# Patient Record
Sex: FEMALE | Race: WHITE | NOT HISPANIC OR LATINO | URBAN - METROPOLITAN AREA
[De-identification: names, ages, dates, MRNs, and addresses within clinical notes are randomized per-mention and may not be internally consistent; named-entity substitution may affect disease eponyms.]

---

## 2018-05-01 ENCOUNTER — OFFICE (OUTPATIENT)
Dept: URBAN - METROPOLITAN AREA CLINIC 32 | Facility: CLINIC | Age: 63
End: 2018-05-01
Payer: COMMERCIAL

## 2018-05-01 VITALS
RESPIRATION RATE: 12 BRPM | DIASTOLIC BLOOD PRESSURE: 90 MMHG | TEMPERATURE: 98.6 F | SYSTOLIC BLOOD PRESSURE: 162 MMHG | DIASTOLIC BLOOD PRESSURE: 75 MMHG | SYSTOLIC BLOOD PRESSURE: 121 MMHG | DIASTOLIC BLOOD PRESSURE: 70 MMHG | RESPIRATION RATE: 16 BRPM | HEART RATE: 56 BPM | HEART RATE: 54 BPM | DIASTOLIC BLOOD PRESSURE: 101 MMHG | HEIGHT: 66 IN | RESPIRATION RATE: 10 BRPM | HEART RATE: 64 BPM | OXYGEN SATURATION: 97 % | WEIGHT: 140 LBS | SYSTOLIC BLOOD PRESSURE: 119 MMHG | DIASTOLIC BLOOD PRESSURE: 82 MMHG | SYSTOLIC BLOOD PRESSURE: 131 MMHG | DIASTOLIC BLOOD PRESSURE: 76 MMHG | DIASTOLIC BLOOD PRESSURE: 86 MMHG | HEART RATE: 68 BPM | SYSTOLIC BLOOD PRESSURE: 127 MMHG | RESPIRATION RATE: 17 BRPM | DIASTOLIC BLOOD PRESSURE: 61 MMHG | SYSTOLIC BLOOD PRESSURE: 149 MMHG | TEMPERATURE: 97.2 F | SYSTOLIC BLOOD PRESSURE: 112 MMHG | OXYGEN SATURATION: 100 % | DIASTOLIC BLOOD PRESSURE: 103 MMHG | SYSTOLIC BLOOD PRESSURE: 122 MMHG | OXYGEN SATURATION: 98 % | HEART RATE: 69 BPM | OXYGEN SATURATION: 93 % | RESPIRATION RATE: 14 BRPM

## 2018-05-01 DIAGNOSIS — Z86.010 PERSONAL HISTORY OF COLONIC POLYPS: ICD-10-CM

## 2018-05-01 PROBLEM — K64.8 OTHER HEMORRHOIDS: Status: ACTIVE | Noted: 2018-05-01

## 2018-05-01 PROBLEM — K57.30 DVRTCLOS OF LG INT W/O PERFORATION OR ABSCESS W/O BLEEDING: Status: ACTIVE | Noted: 2018-05-01

## 2018-05-01 RX ADMIN — LIDOCAINE HYDROCHLORIDE 60 MG: 20 INJECTION, SOLUTION INFILTRATION; PERINEURAL at 08:13

## 2018-06-01 PROBLEM — Z12.11 AVERAGE RISK SCREENING COLON: Status: ACTIVE | Noted: 2018-05-01

## 2023-12-29 ENCOUNTER — APPOINTMENT (OUTPATIENT)
Facility: HOSPITAL | Age: 68
End: 2023-12-29
Payer: MEDICARE

## 2023-12-29 ENCOUNTER — HOSPITAL ENCOUNTER (EMERGENCY)
Facility: HOSPITAL | Age: 68
Discharge: HOME | End: 2023-12-29
Attending: EMERGENCY MEDICINE
Payer: MEDICARE

## 2023-12-29 VITALS
WEIGHT: 128 LBS | HEIGHT: 66 IN | TEMPERATURE: 98.3 F | HEART RATE: 71 BPM | RESPIRATION RATE: 16 BRPM | BODY MASS INDEX: 20.57 KG/M2 | DIASTOLIC BLOOD PRESSURE: 98 MMHG | SYSTOLIC BLOOD PRESSURE: 156 MMHG | OXYGEN SATURATION: 100 %

## 2023-12-29 DIAGNOSIS — S52.502A CLOSED FRACTURE OF DISTAL END OF LEFT RADIUS, UNSPECIFIED FRACTURE MORPHOLOGY, INITIAL ENCOUNTER: Primary | ICD-10-CM

## 2023-12-29 DIAGNOSIS — S52.615A CLOSED NONDISPLACED FRACTURE OF STYLOID PROCESS OF LEFT ULNA, INITIAL ENCOUNTER: ICD-10-CM

## 2023-12-29 PROCEDURE — 99283 EMERGENCY DEPT VISIT LOW MDM: CPT

## 2023-12-29 PROCEDURE — 29125 APPL SHORT ARM SPLINT STATIC: CPT

## 2023-12-29 PROCEDURE — 73110 X-RAY EXAM OF WRIST: CPT

## 2023-12-29 PROCEDURE — 73100 X-RAY EXAM OF WRIST: CPT

## 2023-12-29 PROCEDURE — 6370000000 HC RX 637 (ALT 250 FOR IP): Performed by: EMERGENCY MEDICINE

## 2023-12-29 RX ORDER — HYDROCODONE BITARTRATE AND ACETAMINOPHEN 7.5; 325 MG/1; MG/1
1 TABLET ORAL
Status: COMPLETED | OUTPATIENT
Start: 2023-12-29 | End: 2023-12-29

## 2023-12-29 RX ORDER — HYDROCODONE BITARTRATE AND ACETAMINOPHEN 5; 325 MG/1; MG/1
1 TABLET ORAL EVERY 6 HOURS PRN
Qty: 15 TABLET | Refills: 0 | Status: SHIPPED | OUTPATIENT
Start: 2023-12-29 | End: 2024-01-03

## 2023-12-29 RX ADMIN — HYDROCODONE BITARTRATE AND ACETAMINOPHEN 1 TABLET: 7.5; 325 TABLET ORAL at 14:08

## 2023-12-29 ASSESSMENT — PAIN DESCRIPTION - ORIENTATION: ORIENTATION: RIGHT

## 2023-12-29 ASSESSMENT — LIFESTYLE VARIABLES: HOW OFTEN DO YOU HAVE A DRINK CONTAINING ALCOHOL: NEVER

## 2023-12-29 ASSESSMENT — PAIN SCALES - GENERAL
PAINLEVEL_OUTOF10: 5
PAINLEVEL_OUTOF10: 10
PAINLEVEL_OUTOF10: 7

## 2023-12-29 ASSESSMENT — PAIN DESCRIPTION - LOCATION: LOCATION: WRIST

## 2023-12-29 ASSESSMENT — PAIN - FUNCTIONAL ASSESSMENT
PAIN_FUNCTIONAL_ASSESSMENT: 0-10
PAIN_FUNCTIONAL_ASSESSMENT: 0-10

## 2023-12-29 ASSESSMENT — PAIN DESCRIPTION - DESCRIPTORS: DESCRIPTORS: ACHING

## 2023-12-29 NOTE — ED NOTES
Splint applied by MD Alexandro Calhoun and sling applied. Discharge instructions and medications reviewed with pt. Pt has no questions at this time and will follow up on Tuesday with Ortho.

## 2023-12-29 NOTE — ED PROVIDER NOTES
fracture of distal end of left radius, unspecified fracture morphology, initial encounter    2. Closed nondisplaced fracture of styloid process of left ulna, initial encounter          DISPOSITION/PLAN   Essie Tavares's  results have been reviewed with her. She has been counseled regarding her diagnosis, treatment, and plan. She verbally conveys understanding and agreement of the signs, symptoms, diagnosis, treatment and prognosis and additionally agrees to follow up as discussed. She also agrees with the care-plan and conveys that all of her questions have been answered. I have also provided discharge instructions for her that include: educational information regarding their diagnosis and treatment, and list of reasons why they would want to return to the ED prior to their follow-up appointment, should her condition change. CLINICAL IMPRESSION    Discharge Note: The patient is stable for discharge home. The signs, symptoms, diagnosis, and discharge instructions have been discussed, understanding conveyed, and agreed upon. The patient is to follow up as recommended or return to ER should their symptoms worsen. PATIENT REFERRED TO:  Flaca Marquis MD  [address] [place] [address] [place]  [address]   [phone]             DISCHARGE MEDICATIONS:     Medication List        START taking these medications      HYDROcodone-acetaminophen 5-325 MG per tablet  Commonly known as: Norco  Take 1 tablet by mouth every 6 hours as needed for Pain for up to 5 days. Intended supply: 3 days.  Take lowest dose possible to manage pain Max Daily Amount: 4 tablets               Where to Get Your Medications        These medications were sent to [facility]/pharmacy [address] [place] [address], [address] [place], [place] [id number]      Phone: [phone]   HYDROcodone-acetaminophen 5-325 MG per tablet           DISCONTINUED MEDICATIONS:  There are no discharge medications for this patient.      I am the Primary Clinician of Record.   Mckay Rivera DO (electronically signed)    (Please note that parts of this dictation were completed with voice recognition software. Quite often unanticipated grammatical, syntax, homophones, and other interpretive errors are inadvertently transcribed by the computer software. Please disregards these errors. Please excuse any errors that have escaped final proofreading.)          Mckay Rivera,   12/31/23 1912

## 2023-12-29 NOTE — DISCHARGE INSTRUCTIONS
Dr. Maty Rinaldi should reach out to you for an appointment Tuesday morning. Nothing to eat or drink after midnight on Monday with the exception of sips of water with your normal daily medications on Tuesday morning.

## 2024-01-01 ENCOUNTER — PREP FOR PROCEDURE (OUTPATIENT)
Facility: HOSPITAL | Age: 69
End: 2024-01-01

## 2024-01-01 DIAGNOSIS — S52.532A COLLES' FRACTURE OF LEFT RADIUS, INITIAL ENCOUNTER FOR CLOSED FRACTURE: ICD-10-CM

## 2024-01-01 RX ORDER — SODIUM CHLORIDE, SODIUM LACTATE, POTASSIUM CHLORIDE, CALCIUM CHLORIDE 600; 310; 30; 20 MG/100ML; MG/100ML; MG/100ML; MG/100ML
INJECTION, SOLUTION INTRAVENOUS CONTINUOUS
Status: CANCELLED | OUTPATIENT
Start: 2024-01-01

## 2024-01-01 NOTE — H&P
History and Physical    Patient: Susan Tavares MRN: [id number]  SSN: [id number]    YOB: 1955  Age: 68 y.o.  Sex: female      Subjective:      Susan Tavares is a 68 y.o. female who  fell on her outstretched left wrist on 12/29/2023.  She has a displaced unstable comminuted distal radius fracture and ulna styloid fracture of the left wrist.  She is here for open reduction internal fixation of unstable distal radius fracture.  For more details of preoperative discussion refer to my office note..     No past medical history on file.  No past surgical history on file.   No family history on file.  Social History     Tobacco Use    Smoking status: Not on file    Smokeless tobacco: Not on file   Substance Use Topics    Alcohol use: Not on file      Prior to Admission medications    Medication Sig Start Date End Date Taking? Authorizing Provider   HYDROcodone-acetaminophen (NORCO) 5-325 MG per tablet Take 1 tablet by mouth every 6 hours as needed for Pain for up to 5 days. Intended supply: 3 days. Take lowest dose possible to manage pain Max Daily Amount: 4 tablets 12/29/23 1/3/24  Mckay Rivera, DO        No Known Allergies    Review of Systems:  A comprehensive review of systems was negative.    Objective:     There were no vitals filed for this visit.     Physical Exam:  General Appearance: alert and oriented to person, place and time, well-developed and well-nourished, in no acute distress  Pulmonary/Chest: clear to auscultation bilaterally- no wheezes, rales or rhonchi, normal air movement, no respiratory distress  Cardiovascular: normal rate, normal S1 and S2, no gallops, intact distal pulses, and no carotid bruits  Abdomen: soft, non-tender, non-distended, normal bowel sounds, no masses or organomegaly  Extremities: no cyanosis and no clubbing, except left upper extremity with deformity.  No blisters.  Good vascular supply.  Musculoskeletal: normal range of motion, no joint swelling, deformity

## 2024-01-02 ENCOUNTER — ANESTHESIA EVENT (OUTPATIENT)
Facility: HOSPITAL | Age: 69
End: 2024-01-02
Payer: MEDICARE

## 2024-01-02 ENCOUNTER — APPOINTMENT (OUTPATIENT)
Facility: HOSPITAL | Age: 69
End: 2024-01-02
Attending: ORTHOPAEDIC SURGERY
Payer: MEDICARE

## 2024-01-02 ENCOUNTER — ANESTHESIA (OUTPATIENT)
Facility: HOSPITAL | Age: 69
End: 2024-01-02
Payer: MEDICARE

## 2024-01-02 ENCOUNTER — HOSPITAL ENCOUNTER (OUTPATIENT)
Facility: HOSPITAL | Age: 69
Setting detail: OUTPATIENT SURGERY
Discharge: HOME | End: 2024-01-02
Attending: ORTHOPAEDIC SURGERY | Admitting: ORTHOPAEDIC SURGERY
Payer: MEDICARE

## 2024-01-02 VITALS
BODY MASS INDEX: 20.57 KG/M2 | SYSTOLIC BLOOD PRESSURE: 159 MMHG | RESPIRATION RATE: 13 BRPM | WEIGHT: 128 LBS | HEART RATE: 64 BPM | HEIGHT: 66 IN | OXYGEN SATURATION: 95 % | TEMPERATURE: 98.7 F | DIASTOLIC BLOOD PRESSURE: 81 MMHG

## 2024-01-02 DIAGNOSIS — S52.532A CLOSED COLLES' FRACTURE OF LEFT RADIUS, INITIAL ENCOUNTER: Primary | ICD-10-CM

## 2024-01-02 PROCEDURE — 2709999900 HC NON-CHARGEABLE SUPPLY: Performed by: ORTHOPAEDIC SURGERY

## 2024-01-02 PROCEDURE — 2580000003 HC RX 258: Performed by: ANESTHESIOLOGY

## 2024-01-02 PROCEDURE — C1713 ANCHOR/SCREW BN/BN,TIS/BN: HCPCS | Performed by: ORTHOPAEDIC SURGERY

## 2024-01-02 PROCEDURE — 2500000003 HC RX 250 WO HCPCS: Performed by: ANESTHESIOLOGY

## 2024-01-02 PROCEDURE — 3700000001 HC ADD 15 MINUTES (ANESTHESIA): Performed by: ORTHOPAEDIC SURGERY

## 2024-01-02 PROCEDURE — 6360000002 HC RX W HCPCS: Performed by: ORTHOPAEDIC SURGERY

## 2024-01-02 PROCEDURE — 6360000002 HC RX W HCPCS: Performed by: ANESTHESIOLOGY

## 2024-01-02 PROCEDURE — 7100000000 HC PACU RECOVERY - FIRST 15 MIN: Performed by: ORTHOPAEDIC SURGERY

## 2024-01-02 PROCEDURE — 7100000001 HC PACU RECOVERY - ADDTL 15 MIN: Performed by: ORTHOPAEDIC SURGERY

## 2024-01-02 PROCEDURE — 2580000003 HC RX 258: Performed by: ORTHOPAEDIC SURGERY

## 2024-01-02 PROCEDURE — 3600000014 HC SURGERY LEVEL 4 ADDTL 15MIN: Performed by: ORTHOPAEDIC SURGERY

## 2024-01-02 PROCEDURE — 3700000000 HC ANESTHESIA ATTENDED CARE: Performed by: ORTHOPAEDIC SURGERY

## 2024-01-02 PROCEDURE — 3600000004 HC SURGERY LEVEL 4 BASE: Performed by: ORTHOPAEDIC SURGERY

## 2024-01-02 DEVICE — PEG BONE FXTN L20MM D2.2MM DST VOLAR RDL SMOOTH LOK CRSSLCK: Type: IMPLANTABLE DEVICE | Site: WRIST | Status: FUNCTIONAL

## 2024-01-02 DEVICE — PLATE BNE W22XL41MM 12 H L DST RAD TI LOK COMPR LO PROF NAR: Type: IMPLANTABLE DEVICE | Site: WRIST | Status: FUNCTIONAL

## 2024-01-02 DEVICE — SCREW BNE L14MM DIA2.7MM DST VOLAR RAD LOK FULL THRD SQ DRV: Type: IMPLANTABLE DEVICE | Site: WRIST | Status: FUNCTIONAL

## 2024-01-02 DEVICE — SCREW BONE L22MM DIA2.7MM DSTL VOLAR RAD MULTDIR FULL THRD: Type: IMPLANTABLE DEVICE | Site: WRIST | Status: FUNCTIONAL

## 2024-01-02 DEVICE — PEG BNE FIX L16MM DIA2.2MM DST VOLAR RAD SMOOTH LOK FOR DVR: Type: IMPLANTABLE DEVICE | Site: WRIST | Status: FUNCTIONAL

## 2024-01-02 DEVICE — SCREW BNE L24MM DIA2.7MM DST RAD LOK FULL THRD SQ DRV HD LO: Type: IMPLANTABLE DEVICE | Site: WRIST | Status: FUNCTIONAL

## 2024-01-02 DEVICE — K WIRE FIX DIA1.6MM S STL FOR DST VOLAR PLATING SYS: Type: IMPLANTABLE DEVICE | Site: WRIST | Status: FUNCTIONAL

## 2024-01-02 DEVICE — SCREW BNE L14MM DIA2.7MM DST VOLAR RAD NONLOCKING FULL THRD: Type: IMPLANTABLE DEVICE | Site: WRIST | Status: FUNCTIONAL

## 2024-01-02 RX ORDER — DEXAMETHASONE SODIUM PHOSPHATE 10 MG/ML
INJECTION INTRAMUSCULAR; INTRAVENOUS PRN
Status: DISCONTINUED | OUTPATIENT
Start: 2024-01-02 | End: 2024-01-02 | Stop reason: SDUPTHER

## 2024-01-02 RX ORDER — BUPIVACAINE HYDROCHLORIDE 5 MG/ML
30 INJECTION, SOLUTION EPIDURAL; INTRACAUDAL ONCE
Status: DISCONTINUED | OUTPATIENT
Start: 2024-01-02 | End: 2024-01-02 | Stop reason: HOSPADM

## 2024-01-02 RX ORDER — SODIUM CHLORIDE 0.9 % (FLUSH) 0.9 %
5-40 SYRINGE (ML) INJECTION PRN
Status: DISCONTINUED | OUTPATIENT
Start: 2024-01-02 | End: 2024-01-02 | Stop reason: HOSPADM

## 2024-01-02 RX ORDER — SODIUM CHLORIDE 0.9 % (FLUSH) 0.9 %
5-40 SYRINGE (ML) INJECTION EVERY 12 HOURS SCHEDULED
Status: DISCONTINUED | OUTPATIENT
Start: 2024-01-02 | End: 2024-01-02 | Stop reason: HOSPADM

## 2024-01-02 RX ORDER — HYDROMORPHONE HYDROCHLORIDE 2 MG/ML
INJECTION, SOLUTION INTRAMUSCULAR; INTRAVENOUS; SUBCUTANEOUS PRN
Status: DISCONTINUED | OUTPATIENT
Start: 2024-01-02 | End: 2024-01-02 | Stop reason: SDUPTHER

## 2024-01-02 RX ORDER — SODIUM CHLORIDE, SODIUM LACTATE, POTASSIUM CHLORIDE, CALCIUM CHLORIDE 600; 310; 30; 20 MG/100ML; MG/100ML; MG/100ML; MG/100ML
INJECTION, SOLUTION INTRAVENOUS CONTINUOUS
Status: DISCONTINUED | OUTPATIENT
Start: 2024-01-02 | End: 2024-01-02 | Stop reason: HOSPADM

## 2024-01-02 RX ORDER — MIDAZOLAM HYDROCHLORIDE 1 MG/ML
INJECTION INTRAMUSCULAR; INTRAVENOUS PRN
Status: DISCONTINUED | OUTPATIENT
Start: 2024-01-02 | End: 2024-01-02 | Stop reason: SDUPTHER

## 2024-01-02 RX ORDER — HYDROCODONE BITARTRATE AND ACETAMINOPHEN 5; 325 MG/1; MG/1
1 TABLET ORAL EVERY 6 HOURS PRN
Qty: 12 TABLET | Refills: 0 | Status: SHIPPED | OUTPATIENT
Start: 2024-01-02 | End: 2024-01-05

## 2024-01-02 RX ORDER — PROPOFOL 10 MG/ML
INJECTION, EMULSION INTRAVENOUS PRN
Status: DISCONTINUED | OUTPATIENT
Start: 2024-01-02 | End: 2024-01-02 | Stop reason: SDUPTHER

## 2024-01-02 RX ORDER — SODIUM CHLORIDE 9 MG/ML
INJECTION, SOLUTION INTRAVENOUS PRN
Status: DISCONTINUED | OUTPATIENT
Start: 2024-01-02 | End: 2024-01-02 | Stop reason: HOSPADM

## 2024-01-02 RX ORDER — BUPIVACAINE HYDROCHLORIDE 5 MG/ML
INJECTION, SOLUTION EPIDURAL; INTRACAUDAL PRN
Status: DISCONTINUED | OUTPATIENT
Start: 2024-01-02 | End: 2024-01-02 | Stop reason: ALTCHOICE

## 2024-01-02 RX ORDER — HYDROMORPHONE HYDROCHLORIDE 2 MG/ML
0.5 INJECTION, SOLUTION INTRAMUSCULAR; INTRAVENOUS; SUBCUTANEOUS EVERY 5 MIN PRN
Status: DISCONTINUED | OUTPATIENT
Start: 2024-01-02 | End: 2024-01-02 | Stop reason: HOSPADM

## 2024-01-02 RX ORDER — IBUPROFEN 800 MG/1
800 TABLET ORAL 4 TIMES DAILY PRN
Qty: 120 TABLET | Refills: 1 | Status: SHIPPED | OUTPATIENT
Start: 2024-01-02

## 2024-01-02 RX ORDER — FENTANYL CITRATE 50 UG/ML
INJECTION, SOLUTION INTRAMUSCULAR; INTRAVENOUS PRN
Status: DISCONTINUED | OUTPATIENT
Start: 2024-01-02 | End: 2024-01-02 | Stop reason: SDUPTHER

## 2024-01-02 RX ORDER — KETOROLAC TROMETHAMINE 30 MG/ML
INJECTION, SOLUTION INTRAMUSCULAR; INTRAVENOUS PRN
Status: DISCONTINUED | OUTPATIENT
Start: 2024-01-02 | End: 2024-01-02 | Stop reason: SDUPTHER

## 2024-01-02 RX ORDER — LIDOCAINE HYDROCHLORIDE 20 MG/ML
INJECTION, SOLUTION EPIDURAL; INFILTRATION; INTRACAUDAL; PERINEURAL PRN
Status: DISCONTINUED | OUTPATIENT
Start: 2024-01-02 | End: 2024-01-02 | Stop reason: SDUPTHER

## 2024-01-02 RX ORDER — LISINOPRIL 10 MG/1
10 TABLET ORAL DAILY
COMMUNITY

## 2024-01-02 RX ORDER — ONDANSETRON 2 MG/ML
INJECTION INTRAMUSCULAR; INTRAVENOUS PRN
Status: DISCONTINUED | OUTPATIENT
Start: 2024-01-02 | End: 2024-01-02 | Stop reason: SDUPTHER

## 2024-01-02 RX ADMIN — DEXAMETHASONE SODIUM PHOSPHATE 4 MG: 10 INJECTION INTRAMUSCULAR; INTRAVENOUS at 10:36

## 2024-01-02 RX ADMIN — HYDROMORPHONE HYDROCHLORIDE 0.5 MG: 2 INJECTION INTRAMUSCULAR; INTRAVENOUS; SUBCUTANEOUS at 11:17

## 2024-01-02 RX ADMIN — HYDROMORPHONE HYDROCHLORIDE 0.5 MG: 2 INJECTION INTRAMUSCULAR; INTRAVENOUS; SUBCUTANEOUS at 11:33

## 2024-01-02 RX ADMIN — MIDAZOLAM HYDROCHLORIDE 2 MG: 1 INJECTION, SOLUTION INTRAMUSCULAR; INTRAVENOUS at 10:16

## 2024-01-02 RX ADMIN — HYDROMORPHONE HYDROCHLORIDE 0.5 MG: 2 INJECTION INTRAMUSCULAR; INTRAVENOUS; SUBCUTANEOUS at 10:42

## 2024-01-02 RX ADMIN — SODIUM CHLORIDE, POTASSIUM CHLORIDE, SODIUM LACTATE AND CALCIUM CHLORIDE: 600; 310; 30; 20 INJECTION, SOLUTION INTRAVENOUS at 09:12

## 2024-01-02 RX ADMIN — PHENYLEPHRINE HYDROCHLORIDE 40 MCG: 10 INJECTION INTRAVENOUS at 10:54

## 2024-01-02 RX ADMIN — FENTANYL CITRATE 50 MCG: 50 INJECTION, SOLUTION INTRAMUSCULAR; INTRAVENOUS at 10:39

## 2024-01-02 RX ADMIN — PHENYLEPHRINE HYDROCHLORIDE 80 MCG: 10 INJECTION INTRAVENOUS at 10:50

## 2024-01-02 RX ADMIN — PHENYLEPHRINE HYDROCHLORIDE 80 MCG: 10 INJECTION INTRAVENOUS at 11:24

## 2024-01-02 RX ADMIN — HYDROMORPHONE HYDROCHLORIDE 0.5 MG: 2 INJECTION INTRAMUSCULAR; INTRAVENOUS; SUBCUTANEOUS at 11:04

## 2024-01-02 RX ADMIN — PHENYLEPHRINE HYDROCHLORIDE 80 MCG: 10 INJECTION INTRAVENOUS at 10:33

## 2024-01-02 RX ADMIN — PHENYLEPHRINE HYDROCHLORIDE 40 MCG: 10 INJECTION INTRAVENOUS at 11:00

## 2024-01-02 RX ADMIN — KETOROLAC TROMETHAMINE 15 MG: 30 INJECTION, SOLUTION INTRAMUSCULAR at 11:30

## 2024-01-02 RX ADMIN — ONDANSETRON 4 MG: 2 INJECTION INTRAMUSCULAR; INTRAVENOUS at 11:21

## 2024-01-02 RX ADMIN — CEFAZOLIN 2000 MG: 2 INJECTION, POWDER, FOR SOLUTION INTRAMUSCULAR; INTRAVENOUS at 10:27

## 2024-01-02 RX ADMIN — LIDOCAINE HYDROCHLORIDE 100 MG: 20 INJECTION, SOLUTION EPIDURAL; INFILTRATION; INTRACAUDAL; PERINEURAL at 10:19

## 2024-01-02 RX ADMIN — PROPOFOL 150 MG: 10 INJECTION, EMULSION INTRAVENOUS at 10:20

## 2024-01-02 RX ADMIN — FENTANYL CITRATE 50 MCG: 50 INJECTION, SOLUTION INTRAMUSCULAR; INTRAVENOUS at 10:22

## 2024-01-02 ASSESSMENT — PAIN SCALES - GENERAL: PAINLEVEL_OUTOF10: 0

## 2024-01-02 ASSESSMENT — PAIN - FUNCTIONAL ASSESSMENT: PAIN_FUNCTIONAL_ASSESSMENT: 0-10

## 2024-01-02 NOTE — OP NOTE
[]Hide copied text  []Peter for attribution information  DALJIT NICKO [address], [address]   [place] [address]   OPERATIVE REPORT          Name: Susan Tavares  MRN:  [id number]  : 1955  Age:  68 y.o.  Surgery Date: 2024     PREOPERATIVE DIAGNOSIS: Comminuted, impacted left  distal radius   fracture.     POSTOPERATIVE DIAGNOSIS: Comminuted, impacted left  distal radius   fracture.     PROCEDURE: Open reduction internal fixation with DVR plate, left  wrist.       SURGEON: Tomasz Plummer MD     ANESTHESIA: General with regional block.     ESTIMATED BLOOD LOSS: 5 mL.      COMPLICATIONS: none.       TOURNIQUET TIME: 73 minutes.       BRIEF INDICATION: Patient has an unstable distal radius fracture left     DESCRIPTION OF PROCEDURE: The patient was brought to the operating   Room and placed supine on the operating table. After regional   anesthesia.  The patient was placed under general anesthesia. The  arm placed on   the arm board. The tourniquet was applied. The  arm was prepped and   draped in the usual sterile fashion. A time-out was called, verifying patient   site of surgery, and procedure.  Antibiotic was given preoperatively. At this point,   the extremity was exsanguinated, the tourniquet inflated to 250 mmHg. A   longitudinal incision carried in a zigzag fashion across the wrist flexion crease   was made along the FCR tendon. Dissection carried sharply through skin.   Bleeders were controlled with electrocautery as necessary. The FCR tendon   was identified. The fascia overlying this was split. The tendon was retracted   medially and the floor of the tendon sheath was incised sharply with a knife   and split proximally and distally with scissors. This gave access to the   intermediate level where a digital dissection was used to sweep the muscles   medially.  Care was taken to avoid injury to the neurovascular structures..Deep retractors

## 2024-01-02 NOTE — PROGRESS NOTES
Patient meets criteria for discharge. CAOx4, NAD, no nausea, patient comfortable, put placed in her own arm sling.

## 2024-01-02 NOTE — BRIEF OP NOTE
Brief Postoperative Note      Patient: Susan Tavares  YOB: 1955  MRN: [id number]    Date of Procedure: 1/2/2024    Pre-Op Diagnosis Codes:     * Closed Colles' fracture of left radius, initial encounter [S52.532A]    Post-Op Diagnosis: Same       Procedure(s):  WRIST OPEN REDUCTION INTERNAL FIXATION    Surgeon(s):  Tomasz Plummer MD    Assistant:  * No surgical staff found *    Anesthesia: General    Estimated Blood Loss (mL): Minimal    Complications: None    Specimens:   * No specimens in log *    Implants:  Implant Name Type Inv. Item Serial No.  Lot No. LRB No. Used Action   DVR Crosslock Narrow Mini, Left Screw/Plate/Nail/Aj   [employer]-  Left 1 Implanted   SCREW BNE L14MM DIA2.7MM DST VOLAR RAD NONLOCKING FULL THRD - [id number] Screw/Plate/Nail/Aj SCREW BNE L14MM DIA2.7MM DST VOLAR RAD NONLOCKING FULL THRD  [employer]-  Left 1 Implanted   SCREW BNE L24MM DIA2.7MM DST RAD BUDDY FULL THRD SQ DRV HD LO - [id number] Screw/Plate/Nail/Aj SCREW BNE L24MM DIA2.7MM DST RAD BUDDY FULL THRD SQ DRV HD LO  [employer]-  Left 1 Implanted   PEG BONE FXTN L20MM D2.2MM DST VOLAR RDL SMOOTH BUDDY CRSSLCK - [id number] Screw/Plate/Nail/Aj PEG BONE FXTN L20MM D2.2MM DST VOLAR RDL SMOOTH BUDDY CRSSLCK  [employer]-  Left 2 Implanted   PEG BNE FIX L16MM DIA2.2MM DST VOLAR RAD SMOOTH BUDDY FOR DVR - [id number] Screw/Plate/Nail/Aj PEG BNE FIX L16MM DIA2.2MM DST VOLAR RAD SMOOTH BUDDY FOR DVR  [employer]-  Left 2 Implanted   SCREW BONE L22MM DIA2.7MM DSTL VOLAR RAD MULTDIR FULL THRD - [id number] Screw/Plate/Nail/Aj SCREW BONE L22MM DIA2.7MM DSTL VOLAR RAD MULTDIR FULL THRD  [employer]-  Left 1 Implanted   SCREW BNE L14MM DIA2.7MM DST VOLAR RAD BUDDY FULL THRD SQ DRV - [id number] Screw/Plate/Nail/Aj SCREW BNE L14MM DIA2.7MM DST VOLAR RAD BUDDY FULL THRD SQ DRV  [employer]WD  Left 2 Implanted   K WIRE FIX DIA1.6MM S STL FOR DST VOLAR PLATING

## 2024-01-02 NOTE — H&P
History and Physical    Patient: Susan Tavares MRN: [id number]  SSN: [id number]    YOB: 1955  Age: 68 y.o.  Sex: female      Subjective:      Susan Tavares is a 68 y.o. female who  fell on her outstretched left wrist on 12/29/2023.  She has a displaced unstable comminuted distal radius fracture and ulna styloid fracture of the left wrist.  She is here for open reduction internal fixation of unstable distal radius fracture.  For more details of preoperative discussion refer to my office note..     Past Medical History:   Diagnosis Date    Hypertension      Past Surgical History:   Procedure Laterality Date    FOOT MASS EXCISION Left       No family history on file.  Social History     Tobacco Use    Smoking status: Never    Smokeless tobacco: Never   Substance Use Topics    Alcohol use: Yes     Comment: occasional      Prior to Admission medications    Medication Sig Start Date End Date Taking? Authorizing Provider   lisinopril (PRINIVIL;ZESTRIL) 10 MG tablet Take 1 tablet by mouth daily   Yes Provider, MD Dewayne   HYDROcodone-acetaminophen (NORCO) 5-325 MG per tablet Take 1 tablet by mouth every 6 hours as needed for Pain for up to 5 days. Intended supply: 3 days. Take lowest dose possible to manage pain Max Daily Amount: 4 tablets 12/29/23 1/3/24  Mckay Rivera, DO        Allergies   Allergen Reactions    Sulfa Antibiotics Hives       Review of Systems:  A comprehensive review of systems was negative.    Objective:     Vitals:    01/02/24 0832   BP: (!) 189/107   Pulse: 67   Resp: 16   Temp: 98.9 °F (37.2 °C)   TempSrc: Temporal   SpO2: 100%   Weight: 58.1 kg (128 lb)   Height: 1.676 m (5' 6\")        Physical Exam:  General Appearance: alert and oriented to person, place and time, well-developed and well-nourished, in no acute distress  Pulmonary/Chest: clear to auscultation bilaterally- no wheezes, rales or rhonchi, normal air movement, no respiratory distress  Cardiovascular: normal

## 2024-01-02 NOTE — DISCHARGE INSTRUCTIONS
Upper Extremity Surgery  Discharge Instructions  Tomasz Plummer M.D.        Patient Name  Susan Tavares  Date of procedure  [unfilled]    Procedure  Procedure(s):  WRIST OPEN REDUCTION INTERNAL FIXATION  Surgeon  Surgeon(s) and Role:     * Tomasz Plummer MD - Primary  Date of discharge: [unfilled]  PCP: @PCP@    Please take the time to review the following instructions before you leave the hospital/surgery center and use them as guidelines during your recovery from surgery.  If you have any questions, you may contact my office at [phone].     Wound Care/Dressing Changes      _____ Do not remove your dressings or get them wet.    -------- You may loosen the ace wrap if the dressing is applying uncomfortable pressure.    Showering/Bathing    _____ Don't remove your dressings or get them wet until you are seen at your follow-up appointment with Dr. Plummer.      _____ Don't get your incisions wet until the sutures are removed.  After the sutures are removed, you may shower but do not soak the incision under water for at least 3 weeks after surgery.        Exercises:    _____ May move your fingers as much as  tolerated. Avoid lifting of any objects heavier than a coffee cup.      Ice/Elevation:    _____ Continue ice consistently for the next 48 hours as needed for pain and swelling.  You should ice your shoulder at least three times each day for one week in cycles of ice on for twenty minutes, ice off for twenty minutes.  Repeat for a total of two hours each time.    Diet:    _____ You may advance to your regular diet as tolerated.  Increase your clear liquid intake for the next 2 to 3 days.    Medication:    _____ You will be given a prescription pain medicine when you are discharged from the hospital/surgery center.  Take the medication on an as-needed basis according to the directions on the prescription bottle.  Possible side effects of this medication include dizziness, headache, nausea, vomitting,

## 2024-01-02 NOTE — ANESTHESIA PRE PROCEDURE
Department of Anesthesiology  Preprocedure Note       Name:  Susan Tavares   Age:  68 y.o.  :  1955                                          MRN:  [id number]         Date:  2024      Surgeon: Surgeon(s):  Tomasz Plummer MD    Procedure: Procedure(s):  WRIST OPEN REDUCTION INTERNAL FIXATION    Medications prior to admission:   Prior to Admission medications    Medication Sig Start Date End Date Taking? Authorizing Provider   lisinopril (PRINIVIL;ZESTRIL) 10 MG tablet Take 1 tablet by mouth daily   Yes Provider, MD Dewayne   HYDROcodone-acetaminophen (NORCO) 5-325 MG per tablet Take 1 tablet by mouth every 6 hours as needed for Pain for up to 5 days. Intended supply: 3 days. Take lowest dose possible to manage pain Max Daily Amount: 4 tablets 12/29/23 1/3/24  Mckay Rivera DO       Current medications:    Current Facility-Administered Medications   Medication Dose Route Frequency Provider Last Rate Last Admin   • lactated ringers IV soln infusion   IntraVENous Continuous Tomasz Plummer MD       • ceFAZolin (ANCEF) 2,000 mg in sodium chloride 0.9 % 100 mL IVPB (mini-bag)  2,000 mg IntraVENous Once Tomasz Plummer MD           Allergies:    Allergies   Allergen Reactions   • Sulfa Antibiotics Hives       Problem List:    Patient Active Problem List   Diagnosis Code   • Colles' fracture of left radius, initial encounter for closed fracture S52.532A       Past Medical History:        Diagnosis Date   • Hypertension        Past Surgical History:        Procedure Laterality Date   • FOOT MASS EXCISION Left        Social History:    Social History     Tobacco Use   • Smoking status: Never   • Smokeless tobacco: Never   Substance Use Topics   • Alcohol use: Yes     Comment: occasional                                Counseling given: Not Answered      Vital Signs (Current):   Vitals:    24 0832   BP: (!) 189/107   Pulse: 67   Resp: 16   Temp: 98.9 °F (37.2 °C)   TempSrc: Temporal   SpO2: 100%

## 2024-01-03 NOTE — ANESTHESIA POSTPROCEDURE EVALUATION
Department of Anesthesiology  Postprocedure Note    Patient: Susan Tavares  MRN: [id number]  YOB: 1955  Date of evaluation: 1/3/2024    Procedure Summary       Date: 01/02/24 Room / Location: [facility] MAIN OR [address] [place] [address] MAIN OR    Anesthesia Start: 1012 Anesthesia Stop: 1202    Procedure: WRIST OPEN REDUCTION INTERNAL FIXATION (Left: Wrist) Diagnosis:       Closed Colles' fracture of left radius, initial encounter      (Closed Colles' fracture of left radius, initial encounter [S52.532A])    Surgeons: Tomasz Plummer MD Responsible Provider: Soha Rowley MD    Anesthesia Type: general ASA Status: 2            Anesthesia Type: No value filed.    Jose F Phase I: Jose F Score: 10    Jose F Phase II:      Anesthesia Post Evaluation    Patient location during evaluation: bedside  Patient participation: complete - patient participated  Level of consciousness: awake and alert  Pain score: 2  Airway patency: patent  Nausea & Vomiting: no nausea  Cardiovascular status: hemodynamically stable  Respiratory status: acceptable  Hydration status: stable  Pain management: adequate    No notable events documented.

## 2024-02-01 ENCOUNTER — HOSPITAL ENCOUNTER (OUTPATIENT)
Facility: HOSPITAL | Age: 69
Discharge: HOME | End: 2024-02-01
Payer: MEDICARE

## 2024-02-01 DIAGNOSIS — S52.532G CLOSED COLLES' FRACTURE OF LEFT RADIUS WITH DELAYED HEALING, SUBSEQUENT ENCOUNTER: ICD-10-CM

## 2024-02-01 PROCEDURE — 73110 X-RAY EXAM OF WRIST: CPT

## 2024-02-21 ENCOUNTER — HOSPITAL ENCOUNTER (OUTPATIENT)
Facility: HOSPITAL | Age: 69
Discharge: HOME | End: 2024-02-24
Payer: MEDICARE

## 2024-02-21 ENCOUNTER — TRANSCRIBE ORDERS (OUTPATIENT)
Facility: HOSPITAL | Age: 69
End: 2024-02-21

## 2024-02-21 DIAGNOSIS — S52.532A CLOSED COLLES' FRACTURE OF LEFT RADIUS, INITIAL ENCOUNTER: ICD-10-CM

## 2024-02-21 DIAGNOSIS — S52.532A CLOSED COLLES' FRACTURE OF LEFT RADIUS, INITIAL ENCOUNTER: Primary | ICD-10-CM

## 2024-02-21 PROCEDURE — 73110 X-RAY EXAM OF WRIST: CPT

## (undated) DEVICE — COUNTER NDL 30 COUNT DBL MAG

## (undated) DEVICE — SOLUTION IRRIG 1000ML 0.9% SOD CHL USP POUR PLAS BTL

## (undated) DEVICE — APPLICATOR MEDICATED 26 CC SOLUTION HI LT ORNG CHLORAPREP

## (undated) DEVICE — PADDING CAST W4INXL4YD HIGHLY ABSRB THAN COT EZ APPL

## (undated) DEVICE — PEG BONE FIX L24MM DIA2.2MM DSTL VOLAR RAD SMOOTH LCK FOR
Type: IMPLANTABLE DEVICE | Site: WRIST | Status: NON-FUNCTIONAL
Removed: 2024-01-02

## (undated) DEVICE — SUTURE ETHLN SZ 3-0 L18IN NONABSORBABLE BLK FS-1 L24MM 3/8 663H

## (undated) DEVICE — SYRINGE 20ML LL S/C 50

## (undated) DEVICE — CLEAN UP KIT: Brand: MEDLINE INDUSTRIES, INC.

## (undated) DEVICE — BANDAGE,ELASTIC,ESMARK,STERILE,4"X9',LF: Brand: MEDLINE

## (undated) DEVICE — PACK,SET UP,NO DRAPES: Brand: MEDLINE

## (undated) DEVICE — GAUZE,SPONGE,4"X4",16PLY,XRAY,STRL,LF: Brand: MEDLINE

## (undated) DEVICE — GOWN,SIRUS,POLYRNF,RAGLAN,XL,ST,30/CS: Brand: MEDLINE

## (undated) DEVICE — 3M™ STERI-DRAPE™ U-DRAPE 1015: Brand: STERI-DRAPE™

## (undated) DEVICE — Z DISCONTINUED USE 2132709 NEEDLE HYPO 18GA L1.5IN PNK POLYPR HUB S STL THN WALL FILL

## (undated) DEVICE — BANDAGE COMPR W4INXL5YD WHT BGE POLY COT M E WRP WV HK AND

## (undated) DEVICE — GAUZE,SPONGE,4"X4",16PLY,STRL,LF,10/TRAY: Brand: MEDLINE

## (undated) DEVICE — SYRINGE IRRIG 60ML SFT PLIABLE BLB EZ TO GRP 1 HND USE W/

## (undated) DEVICE — LINER,SEMI-RIGID,3000CC,50EA/CS: Brand: MEDLINE

## (undated) DEVICE — SUTURE VCRL SZ 2-0 L27IN ABSRB UD L26MM CT-2 1/2 CIR J269H

## (undated) DEVICE — BLANKET WRM AD PREM MISTRAL-AIR

## (undated) DEVICE — TOWEL,OR,DSP,ST,BLUE,STD,4/PK,20PK/CS: Brand: MEDLINE

## (undated) DEVICE — GLOVE ORANGE PI 8 1/2   MSG9085

## (undated) DEVICE — NEEDLE HYPO 25GA L1.5IN BLU POLYPR HUB S STL REG BVL STR

## (undated) DEVICE — SPLINT CAST W4INXL15FT FBRGLS INTLOK WRINKLE FREE APPL

## (undated) DEVICE — DRAPE,HAND,STERILE: Brand: MEDLINE

## (undated) DEVICE — BANDAGE COBAN 4 IN COMPR W4INXL5YD FOAM COHESIVE QUIK STK SELF ADH SFT

## (undated) DEVICE — SHEET,DRAPE,53X77,STERILE: Brand: MEDLINE

## (undated) DEVICE — MARKER,SKIN,WI/RULER AND LABELS: Brand: MEDLINE

## (undated) DEVICE — TUBING, SUCTION, 1/4" X 10', STRAIGHT: Brand: MEDLINE

## (undated) DEVICE — YANKAUER,TAPERED BULBOUS TIP,W/O VENT: Brand: MEDLINE

## (undated) DEVICE — ZIMMER® STERILE DISPOSABLE TOURNIQUET CUFF WITH PROTECTIVE SLEEVE AND PLC, DUAL PORT, SINGLE BLADDER, 18 IN. (46 CM)

## (undated) DEVICE — INTENDED FOR TISSUE SEPARATION, AND OTHER PROCEDURES THAT REQUIRE A SHARP SURGICAL BLADE TO PUNCTURE OR CUT.: Brand: BARD-PARKER SAFETY BLADES SIZE 15, STERILE

## (undated) DEVICE — NEEDLE HYPO 21GA L1.5IN GRN POLYPR HUB S STL THN WALL IV

## (undated) DEVICE — PENCIL ES L3M BTTN SWCH S STL HEX LOK BLDE ELECTRD HOLSTER